# Patient Record
Sex: MALE | Race: WHITE | HISPANIC OR LATINO | Employment: UNEMPLOYED | URBAN - METROPOLITAN AREA
[De-identification: names, ages, dates, MRNs, and addresses within clinical notes are randomized per-mention and may not be internally consistent; named-entity substitution may affect disease eponyms.]

---

## 2020-08-04 ENCOUNTER — APPOINTMENT (OUTPATIENT)
Dept: RADIOLOGY | Facility: MEDICAL CENTER | Age: 40
End: 2020-08-04
Attending: EMERGENCY MEDICINE

## 2020-08-04 ENCOUNTER — HOSPITAL ENCOUNTER (EMERGENCY)
Facility: MEDICAL CENTER | Age: 40
End: 2020-08-05
Attending: EMERGENCY MEDICINE

## 2020-08-04 DIAGNOSIS — R20.2 PARESTHESIA: ICD-10-CM

## 2020-08-04 LAB
BASOPHILS # BLD AUTO: 1 % (ref 0–1.8)
BASOPHILS # BLD: 0.06 K/UL (ref 0–0.12)
EOSINOPHIL # BLD AUTO: 0.19 K/UL (ref 0–0.51)
EOSINOPHIL NFR BLD: 3.2 % (ref 0–6.9)
ERYTHROCYTE [DISTWIDTH] IN BLOOD BY AUTOMATED COUNT: 41.7 FL (ref 35.9–50)
HCT VFR BLD AUTO: 42 % (ref 42–52)
HGB BLD-MCNC: 13.9 G/DL (ref 14–18)
IMM GRANULOCYTES # BLD AUTO: 0.01 K/UL (ref 0–0.11)
IMM GRANULOCYTES NFR BLD AUTO: 0.2 % (ref 0–0.9)
LYMPHOCYTES # BLD AUTO: 2.06 K/UL (ref 1–4.8)
LYMPHOCYTES NFR BLD: 35.2 % (ref 22–41)
MCH RBC QN AUTO: 29.3 PG (ref 27–33)
MCHC RBC AUTO-ENTMCNC: 33.1 G/DL (ref 33.7–35.3)
MCV RBC AUTO: 88.4 FL (ref 81.4–97.8)
MONOCYTES # BLD AUTO: 0.47 K/UL (ref 0–0.85)
MONOCYTES NFR BLD AUTO: 8 % (ref 0–13.4)
NEUTROPHILS # BLD AUTO: 3.07 K/UL (ref 1.82–7.42)
NEUTROPHILS NFR BLD: 52.4 % (ref 44–72)
NRBC # BLD AUTO: 0 K/UL
NRBC BLD-RTO: 0 /100 WBC
PLATELET # BLD AUTO: 211 K/UL (ref 164–446)
PMV BLD AUTO: 10.4 FL (ref 9–12.9)
RBC # BLD AUTO: 4.75 M/UL (ref 4.7–6.1)
WBC # BLD AUTO: 5.9 K/UL (ref 4.8–10.8)

## 2020-08-04 PROCEDURE — 700105 HCHG RX REV CODE 258: Performed by: EMERGENCY MEDICINE

## 2020-08-04 PROCEDURE — 99283 EMERGENCY DEPT VISIT LOW MDM: CPT

## 2020-08-04 PROCEDURE — 36415 COLL VENOUS BLD VENIPUNCTURE: CPT

## 2020-08-04 PROCEDURE — 83735 ASSAY OF MAGNESIUM: CPT

## 2020-08-04 PROCEDURE — 85652 RBC SED RATE AUTOMATED: CPT

## 2020-08-04 PROCEDURE — 72100 X-RAY EXAM L-S SPINE 2/3 VWS: CPT

## 2020-08-04 PROCEDURE — 80048 BASIC METABOLIC PNL TOTAL CA: CPT

## 2020-08-04 PROCEDURE — 85025 COMPLETE CBC W/AUTO DIFF WBC: CPT

## 2020-08-04 PROCEDURE — 82550 ASSAY OF CK (CPK): CPT

## 2020-08-04 RX ORDER — METHYLPREDNISOLONE 4 MG/1
TABLET ORAL
Qty: 1 EACH | Refills: 0 | Status: SHIPPED | OUTPATIENT
Start: 2020-08-04

## 2020-08-04 RX ORDER — SODIUM CHLORIDE, SODIUM LACTATE, POTASSIUM CHLORIDE, CALCIUM CHLORIDE 600; 310; 30; 20 MG/100ML; MG/100ML; MG/100ML; MG/100ML
1000 INJECTION, SOLUTION INTRAVENOUS ONCE
Status: COMPLETED | OUTPATIENT
Start: 2020-08-04 | End: 2020-08-05

## 2020-08-04 RX ADMIN — SODIUM CHLORIDE, POTASSIUM CHLORIDE, SODIUM LACTATE AND CALCIUM CHLORIDE 1000 ML: 600; 310; 30; 20 INJECTION, SOLUTION INTRAVENOUS at 23:55

## 2020-08-05 VITALS
SYSTOLIC BLOOD PRESSURE: 109 MMHG | BODY MASS INDEX: 22.43 KG/M2 | OXYGEN SATURATION: 95 % | HEART RATE: 62 BPM | RESPIRATION RATE: 16 BRPM | TEMPERATURE: 97 F | DIASTOLIC BLOOD PRESSURE: 65 MMHG | HEIGHT: 66 IN | WEIGHT: 139.55 LBS

## 2020-08-05 LAB
ANION GAP SERPL CALC-SCNC: 14 MMOL/L (ref 7–16)
BUN SERPL-MCNC: 13 MG/DL (ref 8–22)
CALCIUM SERPL-MCNC: 9.2 MG/DL (ref 8.5–10.5)
CHLORIDE SERPL-SCNC: 105 MMOL/L (ref 96–112)
CK SERPL-CCNC: 108 U/L (ref 0–154)
CO2 SERPL-SCNC: 22 MMOL/L (ref 20–33)
CREAT SERPL-MCNC: 0.73 MG/DL (ref 0.5–1.4)
ERYTHROCYTE [SEDIMENTATION RATE] IN BLOOD BY WESTERGREN METHOD: 4 MM/HOUR (ref 0–15)
GLUCOSE SERPL-MCNC: 102 MG/DL (ref 65–99)
MAGNESIUM SERPL-MCNC: 1.9 MG/DL (ref 1.5–2.5)
POTASSIUM SERPL-SCNC: 4.3 MMOL/L (ref 3.6–5.5)
SODIUM SERPL-SCNC: 141 MMOL/L (ref 135–145)

## 2020-08-05 NOTE — DISCHARGE INSTRUCTIONS
You were seen and evaluated in the Emergency Department at [facility] for:     Sensation changes to your left leg    You had the following tests and studies:    Thankfully your work-up today is reassuring, we do not see anything scary like an infection and her x-rays appear stable.  This could possibly be a bulging disc we will start you on a course of steroid medications, take as prescribed.    You received the following medications:    IV fluids.    You received the following prescriptions:    Medrol Dosepak, take as prescribed.  ----------------------------    Please make sure to follow up with:    [facility] for recheck of blood pressure and routine healthcare, follow-up with Dr. Arora from neurosurgery regarding the sensation changes to your leg, if you get any new or worse symptoms particularly any weakness or worsening sensory changes or bowel or bladder incontinence or difficulty urinating or fevers or any other new or worse symptoms you need to be seen again in the ER immediately.    Good luck, we hope you get better soon!  ----------------------------    We always encourage patients to return IMMEDIATELY if they have:  Increased or changing pain, passing out, fevers over 100.4 (taken in your mouth or rectally) for more than 2 days, redness or swelling of skin or tissues, feeling like your heart is beating fast, chest pain that is new or worsening, trouble breathing, feeling like your throat is closing up and can not breath, inability to walk, weakness of any part of your body, new dizziness, severe bleeding that won't stop from any part of your body, if you can't eat or drink, or if you have any other concerns.   If you feel worse, please know that you can always return with any questions, concerns, worse symptoms, or you are feeling unsafe. We certainly cannot say for sure that we have ruled out every illness or dangerous disease, but we feel that at this specific time, your exam, tests,  and vital signs like heart rate and blood pressure are safe for discharge.     Usted fue visto y evaluado en el Departamento de Emergencias del [facility] para:    Sensación de cambios en la adrianna larry.    Tuviste las siguientes pruebas y estudios:    Afortunadamente medellin trabajo de hoy es tranquilizador, no vemos nada aterrador ange ana infección y joy luci X parecen estables. [place] podría ser un disco abultado. Comenzaremos con un tratamiento con medicamentos esteroides, tomados según lo prescrito.    Recibió los siguientes medicamentos:    Fluidos intravenosos.    Recibió las siguientes recetas:    Medrol Dosepak, tome según lo prescrito.  ----------------------------    Asegúrate de seguir con:    [facility] para la revisión de la presión arterial y la atención médica de rutina, seguimiento con el Dr. Arora de neurocirugía con respecto a los cambios de sensación en la adrianna, si presenta síntomas nuevos o peores, particularmente debilidad o empeoramiento de los cambios sensoriales o incontinencia o dificultad intestinal o vesical orinar o fiebre o cualquier otro síntoma nuevo o peor necesita que lo vean nuevamente en la thad de emergencias inmediatamente    ¡[place] [facility], esperamos que te mejores pronto!  ----------------------------    Siempre alentamos a los pacientes a regresar INMEDIATAMENTE si tienen:  Aumento o cambio del dolor, desmayo, fiebre de más de 100.4 (tomada en la boca o por vía rectal) gold más de 2 días, enrojecimiento o hinchazón de la piel o los tejidos, sensación de que medellin corazón late rápidamente, dolor en el pecho nuevo o que empeora, problemas respiración, sensación de que medellin garganta se está cerrando y no puede respirar, incapacidad para caminar, debilidad en cualquier parte de medellin cuerpo, nuevos mareos, sangrado intenso que no se detendrá en ninguna parte de medellin cuerpo, si no puede comer o surendra, o si tiene alguna otra inquietud.  Si se siente peor, sepa que siempre  puede regresar con cualquier pregunta, inquietud, síntomas peores o si se siente inseguro. Ciertamente, no podemos decir con certeza que hemos descartado todas las enfermedades o enfermedades peligrosas, jaylin creemos que en rajinder momento específico, medellin examen, pruebas y signos vitales ange la frecuencia cardíaca y la presión arterial son seguros para el liseth.

## 2020-08-05 NOTE — ED PROVIDER NOTES
"ED Provider Note    CHIEF COMPLAINT  Chief Complaint   Patient presents with   • Numbness       HPI    Primary care provider: None currently  Means of arrival: Walk-in  History obtained from: Patient  History limited by: Patient prefers to speak Hungarian but is comfortable with my level of language proficiency, offered video  but he prefers to speak directly with me.    Jan Hamlin is a 40 y.o. male who presents with paresthesias to his left leg.  Starts in his mid thigh on the lateral aspect of his left leg and goes down to his mid left lower leg and sometimes down to his ankle also on the lateral side.  This is been going on for 2 to 3 weeks.  Denies any falls or injuries or trauma.  He has no back pain.  No fevers or chills.  He does report a history of a \"spinal infection\" that did not require surgery that was treated with antibiotics last year in [place].  Takes no daily medications no alleviating measures attempted.  No aggravating factors noted.  No pain just has diminished sensation to the lateral aspect of his left leg.  No other numbness or weakness or tingling no bowel or bladder incontinence or inability to void, denies any current drug use no history of IV drug use.  Takes no daily medications.  No chest pain cough dyspnea or known coronavirus contacts.    REVIEW OF SYSTEMS  Constitutional: Negative for fever or chills.   HENT: Negative for rhinorrhea or sore throat.    Respiratory: Negative for cough or shortness of breath.    Cardiovascular: Negative for chest pain or palpitations.   Gastrointestinal: Negative for nausea, vomiting, or abdominal pain.   Genitourinary: Negative for dysuria or flank pain.   Musculoskeletal: Negative for back pain or joint pain or extremity swelling.   Skin: Negative for itching or rash.   Neurological: Positive for diminished sensation to the left leg, no weakness.  No bowel or bladder incontinence or saddle symptoms.  See HPI for further details. All other " "systems are negative.     PAST MEDICAL HISTORY   has a past medical history of Infection.    PAST FAMILY HISTORY  History reviewed. No pertinent family history.    SOCIAL HISTORY  Social History     Tobacco Use   • Smoking status: Current Some Day Smoker     Types: Cigars, Cigarettes   • Smokeless tobacco: Never Used   Substance and Sexual Activity   • Alcohol use: Not Currently   • Drug use: Not Currently     Comment: hx of meth   • Sexual activity: Not on file       SURGICAL HISTORY  patient denies any surgical history    CURRENT MEDICATIONS  No daily medications.    ALLERGIES  No Known Allergies    PHYSICAL EXAM  VITAL SIGNS: /65   Pulse 62   Temp 36.1 °C (97 °F) (Temporal)   Resp 16   Ht 1.676 m (5' 6\")   Wt 63.3 kg (139 lb 8.8 oz)   SpO2 95%   BMI 22.52 kg/m²    Pulse ox interpretation: On room air, I interpret this pulse ox as normal.  Constitutional: Well-developed, well-nourished. Sitting up.   HEENT: Normocephalic, atraumatic. Posterior pharynx clear, mucous membranes slightly dry.  Eyes:  EOMI. Normal sclerae.  Neck: Supple, nontender.  Chest/Pulmonary: Clear to ausculation bilaterally, no wheezes or rhonchi.  Cardiovascular: Regular rate and rhythm, no murmur.   Abdomen: Soft, nontender; no rebound, guarding, or masses.  Back: No CVA or midline tenderness.   Musculoskeletal: No deformity or edema.  Neuro: Alert, no focal weakness or asymmetry, normal steady gait, proprioception intact, 5 out of 5 strength, subjective diminished sensation to the left lateral leg in the thigh of the lower leg.  Psych: Flat affect but cooperative.  Skin: No rashes, warm and dry.      DIAGNOSTIC STUDIES / PROCEDURES    LABS & EKG  Results for orders placed or performed during the hospital encounter of 08/04/20   CBC WITH DIFFERENTIAL   Result Value Ref Range    WBC 5.9 4.8 - 10.8 K/uL    RBC 4.75 4.70 - 6.10 M/uL    Hemoglobin 13.9 (L) 14.0 - 18.0 g/dL    Hematocrit 42.0 42.0 - 52.0 %    MCV 88.4 81.4 - 97.8 fL "    MCH 29.3 27.0 - 33.0 pg    MCHC 33.1 (L) 33.7 - 35.3 g/dL    RDW 41.7 35.9 - 50.0 fL    Platelet Count 211 164 - 446 K/uL    MPV 10.4 9.0 - 12.9 fL    Neutrophils-Polys 52.40 44.00 - 72.00 %    Lymphocytes 35.20 22.00 - 41.00 %    Monocytes 8.00 0.00 - 13.40 %    Eosinophils 3.20 0.00 - 6.90 %    Basophils 1.00 0.00 - 1.80 %    Immature Granulocytes 0.20 0.00 - 0.90 %    Nucleated RBC 0.00 /100 WBC    Neutrophils (Absolute) 3.07 1.82 - 7.42 K/uL    Lymphs (Absolute) 2.06 1.00 - 4.80 K/uL    Monos (Absolute) 0.47 0.00 - 0.85 K/uL    Eos (Absolute) 0.19 0.00 - 0.51 K/uL    Baso (Absolute) 0.06 0.00 - 0.12 K/uL    Immature Granulocytes (abs) 0.01 0.00 - 0.11 K/uL    NRBC (Absolute) 0.00 K/uL   Basic Metabolic Panel   Result Value Ref Range    Sodium 141 135 - 145 mmol/L    Potassium 4.3 3.6 - 5.5 mmol/L    Chloride 105 96 - 112 mmol/L    Co2 22 20 - 33 mmol/L    Glucose 102 (H) 65 - 99 mg/dL    Bun 13 8 - 22 mg/dL    Creatinine 0.73 0.50 - 1.40 mg/dL    Calcium 9.2 8.5 - 10.5 mg/dL    Anion Gap 14.0 7.0 - 16.0   MAGNESIUM   Result Value Ref Range    Magnesium 1.9 1.5 - 2.5 mg/dL   Sed Rate   Result Value Ref Range    Sed Rate Westergren 4 0 - 15 mm/hour   CREATINE KINASE   Result Value Ref Range    CPK Total 108 0 - 154 U/L   ESTIMATED GFR   Result Value Ref Range    GFR If African American >60 >60 mL/min/1.73 m 2    GFR If Non African American >60 >60 mL/min/1.73 m 2       RADIOLOGY  DX-LUMBAR SPINE-2 OR 3 VIEWS   Final Result         1.  No acute traumatic bony injury of the lumbar spine.   2.  Mild anterior wedging of T12, L1, and L2, appearance favors remote mild compression injury.   3.  Degenerative changes with neural foraminal narrowing at L5/S1.          COURSE & MEDICAL DECISION MAKING    This is a 40 y.o. male who presents with decreased sensation to the left lateral leg.    Differential Diagnosis includes but is not limited to:  Radiculopathy, paresthesia, electrolyte abnormality, spinal infection    ED  Course:  This is a 40-year-old male who reports a distant history of a spinal infection currently taking no medications with paresthesias to his left lateral leg.  Plan x-ray to evaluate for occult injury, and labs to screen for evidence of inflammation or infection.  Patient looks slightly dehydrated until surgical process is ruled out I will treated with a crystalloid fluid bolus.  He has no pain.    Work-up reassuring, labs significantly delayed but showed normal sedimentation rate highly doubt spinal infection.  White count normal no fevers here doubt infection.  Electrolytes are stable no acidosis no evidence of a traumatic rhabdomyolysis.  X-ray showed some degenerative disease.    On recheck patient is resting comfortably feeling slightly better this I feel he has had a positive response to parenteral rehydration.  Discussed possibility of radiculopathy, given degenerative changes plan Medrol Dosepak and follow-up with neurosurgery.  No signs of cauda equina at this time or motor deficits doubt acute cord compression syndrome that would merit emergent surgery.  Hopefully patient will improve with steroids he will return immediately for any new or worse symptoms he knows to watch for any red flags like fevers or weakness or saddle symptoms or bowel or bladder issues or any other new or worse symptoms.    Medications   lactated ringers infusion (BOLUS) (0 mL Intravenous Stopped 8/5/20 0007)       FINAL IMPRESSION  1. Paresthesia        PRESCRIPTIONS  Discharge Medication List as of 8/5/2020 12:07 AM      START taking these medications    Details   methylPREDNISolone (MEDROL DOSEPAK) 4 MG Tablet Therapy Pack Use as directed, Disp-1 Each,R-0, Print Rx Paper             FOLLOW UP  [facility], Emergency Dept  [address] [facility] [address] [phone]  Today  If you have ANY new or worse symptoms!    [facility]  [address] [place] [address]  [id number]  [phone]  Schedule an appointment as soon as possible for a visit in 2 days  for recheck and routine health care    Wei Arora M.D.  [address]  [place] [address] [phone]    Schedule an appointment as soon as possible for a visit in 3 days  for recheck with spine surgeon        -DISCHARGE-       Results, exam findings, clinical impression, presumed diagnosis, treatment options, and strict return precautions were discussed with the patient, and they verbalized understanding, agreed with, and appreciated the plan of care.    Pertinent Labs & Imaging studies reviewed and verified by myself, as well as nursing notes and the patient's past medical, family, and social histories (See chart for details).    The patient is referred to a primary physician for blood pressure management, diabetic screening, and for all other preventative health concerns.     Portions of this record were made with voice recognition software.  Despite my review, spelling/grammar/context errors may still remain.  Interpretation of this chart should be taken in this context.    Electronically signed by Juan Mcnair M.D. on 8/5/2020 at 8:48 PM.

## 2020-08-05 NOTE — ED TRIAGE NOTES
Patient to ED with complaints of left lateral leg numbness from hip to foot x3 weeks. Denies trauma. Reports numbness worse at rest. He is ambulatory. Reports hx of lumbar spine infection in past related to drug abuse, but no loner uses drugs. He denies fevers or chills. No swelling.    Not a high risk for covid per ED triage screening questions.     Pt educated on ED process and asked to wait in lobby. Patient educated on importance of alerting staff to new or worsening symptoms or concerns.

## 2020-08-05 NOTE — ED NOTES
Using Yoruba speaking ERP, patient educated on discharge instructions, prescriptions, and home care. Patient verbalized understanding. Patient ambulated to [facility].

## 2020-08-05 NOTE — ED NOTES
Patient changed into gown and placed on monitor. IV started and labs drawn. Using language line, updated patient on plan of care, verbalized understanding. Patient wearing mask on arrival

## 2020-12-18 ENCOUNTER — HOSPITAL ENCOUNTER (EMERGENCY)
Facility: MEDICAL CENTER | Age: 40
End: 2020-12-18
Attending: EMERGENCY MEDICINE
Payer: OTHER GOVERNMENT

## 2020-12-18 VITALS
SYSTOLIC BLOOD PRESSURE: 133 MMHG | BODY MASS INDEX: 22.68 KG/M2 | HEART RATE: 84 BPM | HEIGHT: 66 IN | DIASTOLIC BLOOD PRESSURE: 85 MMHG | TEMPERATURE: 98.5 F | RESPIRATION RATE: 18 BRPM | WEIGHT: 141.09 LBS | OXYGEN SATURATION: 95 %

## 2020-12-18 DIAGNOSIS — J02.9 SORE THROAT: ICD-10-CM

## 2020-12-18 DIAGNOSIS — Z20.822 SUSPECTED COVID-19 VIRUS INFECTION: ICD-10-CM

## 2020-12-18 LAB
COVID ORDER STATUS COVID19: NORMAL
S PYO DNA SPEC NAA+PROBE: NOT DETECTED
SARS-COV-2 RNA RESP QL NAA+PROBE: NOTDETECTED
SPECIMEN SOURCE: NORMAL

## 2020-12-18 PROCEDURE — U0003 INFECTIOUS AGENT DETECTION BY NUCLEIC ACID (DNA OR RNA); SEVERE ACUTE RESPIRATORY SYNDROME CORONAVIRUS 2 (SARS-COV-2) (CORONAVIRUS DISEASE [COVID-19]), AMPLIFIED PROBE TECHNIQUE, MAKING USE OF HIGH THROUGHPUT TECHNOLOGIES AS DESCRIBED BY CMS-2020-01-R: HCPCS

## 2020-12-18 PROCEDURE — 87651 STREP A DNA AMP PROBE: CPT

## 2020-12-18 PROCEDURE — C9803 HOPD COVID-19 SPEC COLLECT: HCPCS | Performed by: EMERGENCY MEDICINE

## 2020-12-18 PROCEDURE — 99284 EMERGENCY DEPT VISIT MOD MDM: CPT

## 2020-12-18 NOTE — DISCHARGE INSTRUCTIONS
Es probable que tenga ana enfermedad viral y puede que tenga COVID-19. En rajinder momento no tenemos estudios disponibles en entorno ambulatorio aqui en el departamento de emergencias para COVID-19. Tambien tenemos estudios limitados para gripe y otros enfermedades viral debido a escasez nacional.  Por lo tanto, COVID-19 no se descarta y necesita permanecer en cuarentena casera hasta que todos los bernadine siguientes son verdaderos:  1. Estas 7 dunaway desde el inicio de sintomas  2. [place] sintomas estan mejorando  3. Ha estado jeanna de fiebre gold al menos 72 horas  Las pruebas solo se realizan a traves del distrito de steve en rajinder momento, puedes llamarles a [phone]. Despues de un proceso de seleccion telefonico, puede o no que le boone la prueba.   Si desarrolla dificultad respiratoria significativa, lo que significa que es dificil caminar incluso distancias cortas sin tener que detenerse y recuperar el aliento, o te mareas mucho y esto es persistente entonces por favor regrese al departamento de emergencias.

## 2020-12-18 NOTE — ED TRIAGE NOTES
"Jan Hamlin  Chief Complaint   Patient presents with   • Sore Throat     x2 days     Pt ambulated to triage without difficulty. Pt reports sore throat x2 days. Pt reports body aches and dry cough for 3 days. Pt denies known COVID exposure. Denies fever, chills, N/V.    Patient informed of triage process and to inform staff of any changes/worsening symptoms. Pt verbalized understanding. Pt denies concerns. Pt back to waiting room.     /95   Pulse 99   Temp 37.2 °C (99 °F) (Temporal)   Resp 16   Ht 1.676 m (5' 6\")   Wt 64 kg (141 lb 1.5 oz)   SpO2 98%   "

## 2020-12-18 NOTE — ED PROVIDER NOTES
ED Provider Note    Scribed for Chrissy Richey M.D. by Radha Kimble. 12/18/2020, 12:49 PM.    Primary care provider: None  Means of arrival: walk in  History obtained from: patient   History limited by: none    This patient was cared for during the COVID-19 pandemic. History and physical exam may be limited/truncated by the inherent challenges of PPE and the need to decrease staff exposure to novel coronavirus. Some aspects of disease management may be different to protect staff and help slow the spread of disease. I verified that, if possible, the patient was wearing a mask and I was wearing appropriate PPE every time I encountered the patient.     CHIEF COMPLAINT  Chief Complaint   Patient presents with   • Sore Throat     x2 days       HPI  Jan Hamlin is a 40 y.o. male who presents to the Emergency Department for weakness onset 2 days. He has associated sore throat, but denies cough or fevers. Patient states he did not go to work yesterday so he needed to come in today to get evaluated. Denies any known COVID exposures.    REVIEW OF SYSTEMS  Pertinent positives include weakness and sore throat. Pertinent negatives include no cough or fevers.    PAST MEDICAL HISTORY   has a past medical history of Infection.    SURGICAL HISTORY  patient denies any surgical history    SOCIAL HISTORY  Social History     Tobacco Use   • Smoking status: Former Smoker     Types: Cigars, Cigarettes   • Smokeless tobacco: Never Used   Substance Use Topics   • Alcohol use: Not Currently   • Drug use: Not Currently     Comment: hx of meth & marijuana      Social History     Substance and Sexual Activity   Drug Use Not Currently    Comment: hx of meth & marijuana       FAMILY HISTORY  No family history on file.    CURRENT MEDICATIONS  Home Medications    **Home medications have not yet been reviewed for this encounter**         ALLERGIES  No Known Allergies    PHYSICAL EXAM  VITAL SIGNS: /95   Pulse 99   Temp 37.2 °C (99 °F)  "(Temporal)   Resp 16   Ht 1.676 m (5' 6\")   Wt 64 kg (141 lb 1.5 oz)   SpO2 98%   BMI 22.77 kg/m²   Constitutional: Alert in no apparent distress.  HENT: No signs of trauma, Bilateral external ears normal, Nose normal. Posterior oropharynx normal without erythema or exudates.  Eyes: Pupils are equal and reactive, Conjunctiva normal, Non-icteric.   Neck: Normal range of motion, No tenderness, Supple, No stridor.  No lymphadenopathy  Cardiovascular: Regular rate and rhythm, no murmurs.   Thorax & Lungs: Normal breath sounds, No respiratory distress, No wheezing, No chest tenderness.   Skin: Warm, Dry, No erythema, No rash.   Musculoskeletal:  No major deformities noted.  Neurologic: Alert, moving all extremities without difficulty, no focal deficits.    LABS  Labs Reviewed   COVID/SARS COV-2    Narrative:     Is patient being admitted?->No  Is the patient a resident in a congregate care setting?->No  Expected turn around time?->Routine (In-House PCR up to 24  hours)  Have you been in close contact with a person who is suspected  or known to be positive for COVID-19 within the last 30 days  (e.g. last seen that person < 30 days ago)->No   GROUP A STREP BY PCR   SARS-COV-2, PCR (IN-HOUSE)    Narrative:     Is patient being admitted?->No  Is the patient a resident in a congregate care setting?->No  Expected turn around time?->Routine (In-House PCR up to 24  hours)  Have you been in close contact with a person who is suspected  or known to be positive for COVID-19 within the last 30 days  (e.g. last seen that person < 30 days ago)->No   RAPID STREP,CULT IF INDICATED     All labs reviewed by me.      COURSE & MEDICAL DECISION MAKING  Pertinent Labs & Imaging studies reviewed. (See chart for details)    12:49 PM - Patient seen and examined at bedside. I discussed that he will be tested for strep and COVID. We will wait for his strep test to return prior to discharge as he will need antibiotics if positive. Ordered " COVID/SARS and strep to evaluate his symptoms.     Rapid strep was negative.  I do think the patient to be discharged.      The patient will return for new or worsening symptoms and is stable at the time of discharge. Patient was given return precautions. Patient and/or family member verbalizes understanding and will comply.    DISPOSITION:  Patient will be discharged home in stable condition.    FOLLOW UP:  [facility], Emergency Dept  [address] [facility] [address] [phone]    Return for worsening shortness of breath, fevers or other concerns      OUTPATIENT MEDICATIONS:  New Prescriptions    No medications on file       FINAL IMPRESSION  1. Sore throat    2. Suspected COVID-19 virus infection               This dictation has been created using voice recognition software and/or scribes. The accuracy of the dictation is limited by the abilities of the software and the expertise of the scribes. I expect there may be some errors of grammar and possibly content. I made every attempt to manually correct the errors within my dictation. However, errors related to voice recognition software and/or scribes may still exist and should be interpreted within the appropriate context.     IRadha (Scribe), am scribing for, and in the presence of, Chrissy Richey M.D..    Electronically signed by: Radha Kimble (Mariela), 12/18/2020    IChrissy M.D. personally performed the services described in this documentation, as scribed by Radha Kimble in my presence, and it is both accurate and complete. E    The note accurately reflects work and decisions made by me.  Chrissy Richey M.D.  12/18/2020  2:34 PM

## 2020-12-18 NOTE — ED NOTES
"Pt discharged home, A&Ox4, steady gait, instructed to view results on my chart,, pt in possession of belongings. Pt provided discharge education and information pertaining to medications and follow up appointments. Pt received copy of discharge instructions and verbalized understanding. /85   Pulse 84   Temp 36.9 °C (98.5 °F) (Temporal)   Resp 18   Ht 1.676 m (5' 6\")   Wt 64 kg (141 lb 1.5 oz)   SpO2 95%   BMI 22.77 kg/m²   "

## 2021-01-20 ENCOUNTER — HOSPITAL ENCOUNTER (EMERGENCY)
Facility: MEDICAL CENTER | Age: 41
End: 2021-01-20
Attending: EMERGENCY MEDICINE | Admitting: EMERGENCY MEDICINE
Payer: OTHER GOVERNMENT

## 2021-01-20 ENCOUNTER — APPOINTMENT (OUTPATIENT)
Dept: RADIOLOGY | Facility: MEDICAL CENTER | Age: 41
End: 2021-01-20
Attending: EMERGENCY MEDICINE
Payer: OTHER GOVERNMENT

## 2021-01-20 VITALS
SYSTOLIC BLOOD PRESSURE: 150 MMHG | RESPIRATION RATE: 14 BRPM | BODY MASS INDEX: 23.38 KG/M2 | HEART RATE: 66 BPM | TEMPERATURE: 98.4 F | DIASTOLIC BLOOD PRESSURE: 84 MMHG | WEIGHT: 145.5 LBS | HEIGHT: 66 IN | OXYGEN SATURATION: 97 %

## 2021-01-20 DIAGNOSIS — Z20.822 SUSPECTED COVID-19 VIRUS INFECTION: ICD-10-CM

## 2021-01-20 LAB
ALBUMIN SERPL BCP-MCNC: 4 G/DL (ref 3.2–4.9)
ALBUMIN/GLOB SERPL: 1.7 G/DL
ALP SERPL-CCNC: 85 U/L (ref 30–99)
ALT SERPL-CCNC: 9 U/L (ref 2–50)
ANION GAP SERPL CALC-SCNC: 6 MMOL/L (ref 7–16)
AST SERPL-CCNC: 14 U/L (ref 12–45)
BASOPHILS # BLD AUTO: 0.4 % (ref 0–1.8)
BASOPHILS # BLD: 0.02 K/UL (ref 0–0.12)
BILIRUB SERPL-MCNC: 0.3 MG/DL (ref 0.1–1.5)
BUN SERPL-MCNC: 10 MG/DL (ref 8–22)
CALCIUM SERPL-MCNC: 8.9 MG/DL (ref 8.5–10.5)
CHLORIDE SERPL-SCNC: 103 MMOL/L (ref 96–112)
CO2 SERPL-SCNC: 25 MMOL/L (ref 20–33)
CREAT SERPL-MCNC: 0.68 MG/DL (ref 0.5–1.4)
EOSINOPHIL # BLD AUTO: 0.1 K/UL (ref 0–0.51)
EOSINOPHIL NFR BLD: 1.8 % (ref 0–6.9)
ERYTHROCYTE [DISTWIDTH] IN BLOOD BY AUTOMATED COUNT: 41.9 FL (ref 35.9–50)
GLOBULIN SER CALC-MCNC: 2.3 G/DL (ref 1.9–3.5)
GLUCOSE SERPL-MCNC: 144 MG/DL (ref 65–99)
HCT VFR BLD AUTO: 43.5 % (ref 42–52)
HGB BLD-MCNC: 14.3 G/DL (ref 14–18)
IMM GRANULOCYTES # BLD AUTO: 0.02 K/UL (ref 0–0.11)
IMM GRANULOCYTES NFR BLD AUTO: 0.4 % (ref 0–0.9)
LIPASE SERPL-CCNC: 32 U/L (ref 11–82)
LYMPHOCYTES # BLD AUTO: 1.15 K/UL (ref 1–4.8)
LYMPHOCYTES NFR BLD: 21.2 % (ref 22–41)
MCH RBC QN AUTO: 29.7 PG (ref 27–33)
MCHC RBC AUTO-ENTMCNC: 32.9 G/DL (ref 33.7–35.3)
MCV RBC AUTO: 90.4 FL (ref 81.4–97.8)
MONOCYTES # BLD AUTO: 0.37 K/UL (ref 0–0.85)
MONOCYTES NFR BLD AUTO: 6.8 % (ref 0–13.4)
NEUTROPHILS # BLD AUTO: 3.77 K/UL (ref 1.82–7.42)
NEUTROPHILS NFR BLD: 69.4 % (ref 44–72)
NRBC # BLD AUTO: 0 K/UL
NRBC BLD-RTO: 0 /100 WBC
PLATELET # BLD AUTO: 228 K/UL (ref 164–446)
PMV BLD AUTO: 10.4 FL (ref 9–12.9)
POTASSIUM SERPL-SCNC: 4.2 MMOL/L (ref 3.6–5.5)
PROT SERPL-MCNC: 6.3 G/DL (ref 6–8.2)
RBC # BLD AUTO: 4.81 M/UL (ref 4.7–6.1)
SARS-COV-2 RNA RESP QL NAA+PROBE: NOTDETECTED
SODIUM SERPL-SCNC: 134 MMOL/L (ref 135–145)
SPECIMEN SOURCE: NORMAL
WBC # BLD AUTO: 5.4 K/UL (ref 4.8–10.8)

## 2021-01-20 PROCEDURE — 700111 HCHG RX REV CODE 636 W/ 250 OVERRIDE (IP): Performed by: EMERGENCY MEDICINE

## 2021-01-20 PROCEDURE — 80053 COMPREHEN METABOLIC PANEL: CPT

## 2021-01-20 PROCEDURE — 71045 X-RAY EXAM CHEST 1 VIEW: CPT

## 2021-01-20 PROCEDURE — U0005 INFEC AGEN DETEC AMPLI PROBE: HCPCS

## 2021-01-20 PROCEDURE — 85025 COMPLETE CBC W/AUTO DIFF WBC: CPT

## 2021-01-20 PROCEDURE — 83690 ASSAY OF LIPASE: CPT

## 2021-01-20 PROCEDURE — U0003 INFECTIOUS AGENT DETECTION BY NUCLEIC ACID (DNA OR RNA); SEVERE ACUTE RESPIRATORY SYNDROME CORONAVIRUS 2 (SARS-COV-2) (CORONAVIRUS DISEASE [COVID-19]), AMPLIFIED PROBE TECHNIQUE, MAKING USE OF HIGH THROUGHPUT TECHNOLOGIES AS DESCRIBED BY CMS-2020-01-R: HCPCS

## 2021-01-20 PROCEDURE — 700105 HCHG RX REV CODE 258: Performed by: EMERGENCY MEDICINE

## 2021-01-20 PROCEDURE — 99284 EMERGENCY DEPT VISIT MOD MDM: CPT

## 2021-01-20 PROCEDURE — 96374 THER/PROPH/DIAG INJ IV PUSH: CPT

## 2021-01-20 RX ORDER — ONDANSETRON 2 MG/ML
4 INJECTION INTRAMUSCULAR; INTRAVENOUS ONCE
Status: COMPLETED | OUTPATIENT
Start: 2021-01-20 | End: 2021-01-20

## 2021-01-20 RX ORDER — ONDANSETRON 4 MG/1
4 TABLET, ORALLY DISINTEGRATING ORAL EVERY 6 HOURS PRN
Qty: 10 TAB | Refills: 0 | Status: SHIPPED | OUTPATIENT
Start: 2021-01-20

## 2021-01-20 RX ORDER — IBUPROFEN 800 MG/1
800 TABLET ORAL EVERY 8 HOURS PRN
Qty: 30 TAB | Refills: 0 | Status: SHIPPED | OUTPATIENT
Start: 2021-01-20

## 2021-01-20 RX ORDER — SODIUM CHLORIDE, SODIUM LACTATE, POTASSIUM CHLORIDE, CALCIUM CHLORIDE 600; 310; 30; 20 MG/100ML; MG/100ML; MG/100ML; MG/100ML
1000 INJECTION, SOLUTION INTRAVENOUS ONCE
Status: COMPLETED | OUTPATIENT
Start: 2021-01-20 | End: 2021-01-20

## 2021-01-20 RX ADMIN — ONDANSETRON 4 MG: 2 INJECTION INTRAMUSCULAR; INTRAVENOUS at 12:15

## 2021-01-20 RX ADMIN — SODIUM CHLORIDE, POTASSIUM CHLORIDE, SODIUM LACTATE AND CALCIUM CHLORIDE 1000 ML: 600; 310; 30; 20 INJECTION, SOLUTION INTRAVENOUS at 12:15

## 2021-01-20 ASSESSMENT — PAIN DESCRIPTION - PAIN TYPE: TYPE: ACUTE PAIN

## 2021-01-20 NOTE — ED TRIAGE NOTES
Chief Complaint   Patient presents with   • Headache     Pt reports vomiting 3 days ago and has been feeling generally weak in his legs and has a headache today   • Weakness   • Nausea     Pt/staff masked and in appropriate PPE during encounter.

## 2021-01-20 NOTE — ED PROVIDER NOTES
ED Provider Note    CHIEF COMPLAINT  Chief Complaint   Patient presents with   • Headache     Pt reports vomiting 3 days ago and has been feeling generally weak in his legs and has a headache today   • Weakness   • Nausea       HPI  Jan Segal is a 40 y.o. male who presents for evaluation of headache body aches nausea not feeling well.  The patient has no stated medical history.  He reports possible exposure to individuals with COVID-19.  He reports low-grade fever and mild cough.  He reports diffuse myalgias.  No abdominal pain.  He reports mild nausea without vomiting or diarrhea.  Symptoms have been present for 2 to 3 days    REVIEW OF SYSTEMS  See HPI for further details.  No night sweats weight loss numbness tingling weakness rash all other systems are negative.     PAST MEDICAL HISTORY  Past Medical History:   Diagnosis Date   • Infection     spine       FAMILY HISTORY  Noncontributory    SOCIAL HISTORY  Social History     Socioeconomic History   • Marital status: Single     Spouse name: Not on file   • Number of children: Not on file   • Years of education: Not on file   • Highest education level: Not on file   Occupational History   • Not on file   Social Needs   • Financial resource strain: Not on file   • Food insecurity     Worry: Not on file     Inability: Not on file   • Transportation needs     Medical: Not on file     Non-medical: Not on file   Tobacco Use   • Smoking status: Former Smoker     Types: Cigars, Cigarettes   • Smokeless tobacco: Never Used   Substance and Sexual Activity   • Alcohol use: Not Currently   • Drug use: Not Currently     Comment: hx of meth & marijuana   • Sexual activity: Not on file   Lifestyle   • Physical activity     Days per week: Not on file     Minutes per session: Not on file   • Stress: Not on file   Relationships   • Social connections     Talks on phone: Not on file     Gets together: Not on file     Attends Buddhist service: Not on file      "Active member of club or organization: Not on file     Attends meetings of clubs or organizations: Not on file     Relationship status: Not on file   • Intimate partner violence     Fear of current or ex partner: Not on file     Emotionally abused: Not on file     Physically abused: Not on file     Forced sexual activity: Not on file   Other Topics Concern   • Not on file   Social History Narrative   • Not on file     Denies IV drugs   SURGICAL HISTORY  No past surgical history on file.    CURRENT MEDICATIONS  Home Medications    **Home medications have not yet been reviewed for this encounter**         ALLERGIES  No Known Allergies    PHYSICAL EXAM  VITAL SIGNS: /83   Pulse 61   Temp 36.4 °C (97.6 °F) (Temporal)   Resp 15   Ht 1.676 m (5' 6\")   Wt 66 kg (145 lb 8.1 oz)   SpO2 98%   BMI 23.48 kg/m²       Constitutional: Well developed, Well nourished, No acute distress, Non-toxic appearance.   HENT: Normocephalic, Atraumatic, Bilateral external ears normal, Oropharynx moist, No oral exudates, Nose normal.   Eyes: PERRLA, EOMI, Conjunctiva normal, No discharge.   Neck: Normal range of motion, No tenderness, Supple, No stridor.   Cardiovascular: Normal heart rate, Normal rhythm, No murmurs, No rubs, No gallops.   Thorax & Lungs: Normal breath sounds, No respiratory distress, No wheezing, No chest tenderness.   Abdomen: Bowel sounds normal, Soft, No tenderness, No masses, No pulsatile masses.   Skin: Warm, Dry, No erythema, No rash.   Back: No tenderness, No CVA tenderness.   Extremities: Intact distal pulses, No edema, No tenderness, No cyanosis, No clubbing.   Neurologic: Alert & oriented x 3, Normal motor function, Normal sensory function, No focal deficits noted.   Psychiatric: Anxious    Results for orders placed or performed during the hospital encounter of 01/20/21   CBC WITH DIFFERENTIAL   Result Value Ref Range    WBC 5.4 4.8 - 10.8 K/uL    RBC 4.81 4.70 - 6.10 M/uL    Hemoglobin 14.3 14.0 - 18.0 " g/dL    Hematocrit 43.5 42.0 - 52.0 %    MCV 90.4 81.4 - 97.8 fL    MCH 29.7 27.0 - 33.0 pg    MCHC 32.9 (L) 33.7 - 35.3 g/dL    RDW 41.9 35.9 - 50.0 fL    Platelet Count 228 164 - 446 K/uL    MPV 10.4 9.0 - 12.9 fL    Neutrophils-Polys 69.40 44.00 - 72.00 %    Lymphocytes 21.20 (L) 22.00 - 41.00 %    Monocytes 6.80 0.00 - 13.40 %    Eosinophils 1.80 0.00 - 6.90 %    Basophils 0.40 0.00 - 1.80 %    Immature Granulocytes 0.40 0.00 - 0.90 %    Nucleated RBC 0.00 /100 WBC    Neutrophils (Absolute) 3.77 1.82 - 7.42 K/uL    Lymphs (Absolute) 1.15 1.00 - 4.80 K/uL    Monos (Absolute) 0.37 0.00 - 0.85 K/uL    Eos (Absolute) 0.10 0.00 - 0.51 K/uL    Baso (Absolute) 0.02 0.00 - 0.12 K/uL    Immature Granulocytes (abs) 0.02 0.00 - 0.11 K/uL    NRBC (Absolute) 0.00 K/uL   Comp Metabolic Panel   Result Value Ref Range    Sodium 134 (L) 135 - 145 mmol/L    Potassium 4.2 3.6 - 5.5 mmol/L    Chloride 103 96 - 112 mmol/L    Co2 25 20 - 33 mmol/L    Anion Gap 6.0 (L) 7.0 - 16.0    Glucose 144 (H) 65 - 99 mg/dL    Bun 10 8 - 22 mg/dL    Creatinine 0.68 0.50 - 1.40 mg/dL    Calcium 8.9 8.5 - 10.5 mg/dL    AST(SGOT) 14 12 - 45 U/L    ALT(SGPT) 9 2 - 50 U/L    Alkaline Phosphatase 85 30 - 99 U/L    Total Bilirubin 0.3 0.1 - 1.5 mg/dL    Albumin 4.0 3.2 - 4.9 g/dL    Total Protein 6.3 6.0 - 8.2 g/dL    Globulin 2.3 1.9 - 3.5 g/dL    A-G Ratio 1.7 g/dL   LIPASE   Result Value Ref Range    Lipase 32 11 - 82 U/L   ESTIMATED GFR   Result Value Ref Range    GFR If African American >60 >60 mL/min/1.73 m 2    GFR If Non African American >60 >60 mL/min/1.73 m 2       COURSE & MEDICAL DECISION MAKING  Pertinent Labs & Imaging studies reviewed. (See chart for details)  DX-CHEST-PORTABLE (1 VIEW)   Final Result      No evidence of acute cardiopulmonary process.        IV was established.  The patient was nauseous and therefore was given antiemetics he failed oral liquid challenge therefore he was given IV fluids and observed for 2 hours.  He feels  much better.  Vital signs improved.  Here he has a clear chest x-ray laboratory studies are notable only for mild lymphopenia.  COVID-19 is obviously a consideration.  We will swab him.  He has no high fever tachycardia or hypoxia or infiltrates to suggest severe COVID-19.  I counseled the patient to quarantine at home.  He will be given a prescription of Zofran and high-dose ibuprofen and advised to return as needed for new or worsening symptoms    FINAL IMPRESSION  1.  Suspected COVID-19         Electronically signed by: Marcello Baldwin M.D., 1/20/2021 11:56 AM

## 2021-01-20 NOTE — ED NOTES
Patient ambulatory back to room for triaged complaint. The patient states this happened a month and a half ago and went away and now it's happening again. The patient rates his headache at 5/10 which came before the nausea/vomiting.

## 2021-01-20 NOTE — ED NOTES
Discharge teaching and paperwork provided regarding suspected COVID-19 infection and all questions/concerns answered. VSS, assessment stable and PIV removed. Given information regarding home care and Rx. Patient discharged to the care of himself and ambulated out of the ED.